# Patient Record
Sex: FEMALE | Race: WHITE
[De-identification: names, ages, dates, MRNs, and addresses within clinical notes are randomized per-mention and may not be internally consistent; named-entity substitution may affect disease eponyms.]

---

## 2020-09-29 ENCOUNTER — HOSPITAL ENCOUNTER (EMERGENCY)
Dept: HOSPITAL 26 - MED | Age: 50
Discharge: HOME | End: 2020-09-29
Payer: MEDICAID

## 2020-09-29 VITALS — SYSTOLIC BLOOD PRESSURE: 106 MMHG | DIASTOLIC BLOOD PRESSURE: 66 MMHG

## 2020-09-29 VITALS — HEIGHT: 58 IN | WEIGHT: 125 LBS | BODY MASS INDEX: 26.24 KG/M2

## 2020-09-29 DIAGNOSIS — N39.0: Primary | ICD-10-CM

## 2020-09-29 DIAGNOSIS — M72.2: ICD-10-CM

## 2020-09-29 PROCEDURE — 99284 EMERGENCY DEPT VISIT MOD MDM: CPT

## 2020-09-29 PROCEDURE — 82948 REAGENT STRIP/BLOOD GLUCOSE: CPT

## 2020-09-29 PROCEDURE — 96372 THER/PROPH/DIAG INJ SC/IM: CPT

## 2020-09-29 PROCEDURE — 81002 URINALYSIS NONAUTO W/O SCOPE: CPT

## 2020-09-29 NOTE — NUR
Patient discharged with v/s stable. Written and verbal after care instructions 
given and explained. 

Patient alert, oriented and verbalized understanding of instructions. 
Ambulatory with steady gait. All questions addressed prior to discharge. ID 
band removed. Patient advised to follow up with PMD. Rx of macrobid, naproxen 
given. Patient educated on indication of medication including possible reaction 
and side effects. Opportunity to ask questions provided and answered.

## 2020-09-29 NOTE — NUR
49 year old female coming in for c/o urinary burning x 2 days. states that she 
has seen PCP and was told she has UTI. denies hematuria. denies any other pain. 
denies other s/sx. denies any injury or trauma. denies pmhx. nkda.

## 2020-11-29 ENCOUNTER — HOSPITAL ENCOUNTER (EMERGENCY)
Dept: HOSPITAL 26 - MED | Age: 50
Discharge: HOME | End: 2020-11-29
Payer: MEDICAID

## 2020-11-29 VITALS — BODY MASS INDEX: 25.2 KG/M2 | WEIGHT: 125 LBS | HEIGHT: 59 IN

## 2020-11-29 VITALS — DIASTOLIC BLOOD PRESSURE: 61 MMHG | SYSTOLIC BLOOD PRESSURE: 100 MMHG

## 2020-11-29 VITALS — SYSTOLIC BLOOD PRESSURE: 100 MMHG | DIASTOLIC BLOOD PRESSURE: 61 MMHG

## 2020-11-29 DIAGNOSIS — M54.6: ICD-10-CM

## 2020-11-29 DIAGNOSIS — R05: ICD-10-CM

## 2020-11-29 DIAGNOSIS — M79.10: Primary | ICD-10-CM

## 2020-11-29 LAB
ANION GAP SERPL CALCULATED.3IONS-SCNC: 10.9 MMOL/L (ref 8–16)
APPEARANCE UR: CLEAR
BILIRUB UR QL STRIP: NEGATIVE
BUN SERPL-MCNC: 9 MG/DL (ref 7–18)
CHLORIDE SERPL-SCNC: 103 MMOL/L (ref 98–107)
CO2 SERPL-SCNC: 29.8 MMOL/L (ref 21–32)
COLOR UR: YELLOW
CREAT SERPL-MCNC: 0.9 MG/DL (ref 0.6–1.3)
GFR SERPL CREATININE-BSD FRML MDRD: 86 ML/MIN (ref 90–?)
GLUCOSE SERPL-MCNC: 107 MG/DL (ref 74–106)
GLUCOSE UR STRIP-MCNC: (no result) MG/DL
HGB UR QL STRIP: (no result)
LEUKOCYTE ESTERASE UR QL STRIP: NEGATIVE
NITRITE UR QL STRIP: NEGATIVE
PH UR STRIP: 6.5 [PH] (ref 5–9)
POTASSIUM SERPL-SCNC: 3.7 MMOL/L (ref 3.5–5.1)
RBC #/AREA URNS HPF: (no result) /HPF (ref 0–5)
SODIUM SERPL-SCNC: 140 MMOL/L (ref 136–145)
WBC,URINE: (no result) /HPF (ref 0–5)

## 2020-11-29 PROCEDURE — 96372 THER/PROPH/DIAG INJ SC/IM: CPT

## 2020-11-29 PROCEDURE — 71045 X-RAY EXAM CHEST 1 VIEW: CPT

## 2020-11-29 PROCEDURE — 87086 URINE CULTURE/COLONY COUNT: CPT

## 2020-11-29 PROCEDURE — 99284 EMERGENCY DEPT VISIT MOD MDM: CPT

## 2020-11-29 PROCEDURE — 80048 BASIC METABOLIC PNL TOTAL CA: CPT

## 2020-11-29 PROCEDURE — 81025 URINE PREGNANCY TEST: CPT

## 2020-11-29 PROCEDURE — 81001 URINALYSIS AUTO W/SCOPE: CPT

## 2020-11-29 PROCEDURE — 36415 COLL VENOUS BLD VENIPUNCTURE: CPT

## 2020-11-29 NOTE — NUR
49 YEAR OLD FEMALE COMPLAINS OF LOWER BACK PAIN SINCE YESTERDAY.  PT DENIES ANY 
TRAUMA, ONLY PROVOKED WHEN MOVEMENT OCCURS. PT DENIES ANY PROBLEMS WITH 
URINATION OR DEFECATION. PT AOX4, BREATHING EVEN AND UNLABORED, SKIN WARM AND 
DRY. BED IN LOWEST POSITION, LOCKED, BED RAIL UPX1.



PMH - DENIES

ALLERGIES - NKA

## 2020-11-29 NOTE — NUR
Patient discharged with v/s stable. Written and verbal after care instructions 
given and explained. 

Patient alert, oriented and verbalized understanding of instructions. 
Ambulatory with steady gait. All questions addressed prior to discharge. ID 
band removed. Patient advised to follow up with PMD. Rx of FLEXERIL AND 
NAPROSYN given. Patient educated on indication of medication including possible 
reaction and side effects. Opportunity to ask questions provided and answered.

## 2021-04-19 ENCOUNTER — HOSPITAL ENCOUNTER (EMERGENCY)
Dept: HOSPITAL 26 - MED | Age: 51
Discharge: HOME | End: 2021-04-19
Payer: MEDICAID

## 2021-04-19 VITALS — HEIGHT: 60 IN | BODY MASS INDEX: 23.56 KG/M2 | WEIGHT: 120 LBS

## 2021-04-19 VITALS — DIASTOLIC BLOOD PRESSURE: 63 MMHG | SYSTOLIC BLOOD PRESSURE: 111 MMHG

## 2021-04-19 VITALS — DIASTOLIC BLOOD PRESSURE: 50 MMHG | SYSTOLIC BLOOD PRESSURE: 108 MMHG

## 2021-04-19 DIAGNOSIS — Z79.899: ICD-10-CM

## 2021-04-19 DIAGNOSIS — Z98.890: ICD-10-CM

## 2021-04-19 DIAGNOSIS — K59.09: ICD-10-CM

## 2021-04-19 DIAGNOSIS — K29.70: Primary | ICD-10-CM

## 2021-04-19 LAB
ALBUMIN FLD-MCNC: 3.7 G/DL (ref 3.4–5)
ANION GAP SERPL CALCULATED.3IONS-SCNC: 10.4 MMOL/L (ref 8–16)
APPEARANCE UR: (no result)
AST SERPL-CCNC: 18 U/L (ref 15–37)
BASOPHILS # BLD AUTO: 0 K/UL (ref 0–0.22)
BASOPHILS NFR BLD AUTO: 0.5 % (ref 0–2)
BILIRUB SERPL-MCNC: 0.4 MG/DL (ref 0–1)
BILIRUB UR QL STRIP: NEGATIVE
BUN SERPL-MCNC: 15 MG/DL (ref 7–18)
CHLORIDE SERPL-SCNC: 106 MMOL/L (ref 98–107)
CO2 SERPL-SCNC: 26.2 MMOL/L (ref 21–32)
COLOR UR: YELLOW
CREAT SERPL-MCNC: 0.5 MG/DL (ref 0.6–1.3)
EOSINOPHIL # BLD AUTO: 0 K/UL (ref 0–0.4)
EOSINOPHIL NFR BLD AUTO: 0.8 % (ref 0–4)
ERYTHROCYTE [DISTWIDTH] IN BLOOD BY AUTOMATED COUNT: 12.7 % (ref 11.6–13.7)
GFR SERPL CREATININE-BSD FRML MDRD: 169 ML/MIN (ref 90–?)
GLUCOSE SERPL-MCNC: 121 MG/DL (ref 74–106)
GLUCOSE UR STRIP-MCNC: NEGATIVE MG/DL
HCT VFR BLD AUTO: 33.3 % (ref 36–48)
HGB BLD-MCNC: 11.1 G/DL (ref 12–16)
HGB UR QL STRIP: (no result)
LEUKOCYTE ESTERASE UR QL STRIP: NEGATIVE
LIPASE SERPL-CCNC: 213 U/L (ref 73–393)
LYMPHOCYTES # BLD AUTO: 1.2 K/UL (ref 2.5–16.5)
LYMPHOCYTES NFR BLD AUTO: 24 % (ref 20.5–51.1)
MCH RBC QN AUTO: 30 PG (ref 27–31)
MCHC RBC AUTO-ENTMCNC: 33 G/DL (ref 33–37)
MCV RBC AUTO: 88.9 FL (ref 80–94)
MONOCYTES # BLD AUTO: 0.4 K/UL (ref 0.8–1)
MONOCYTES NFR BLD AUTO: 8.7 % (ref 1.7–9.3)
NEUTROPHILS # BLD AUTO: 3.3 K/UL (ref 1.8–7.7)
NEUTROPHILS NFR BLD AUTO: 66 % (ref 42.2–75.2)
NITRITE UR QL STRIP: NEGATIVE
PH UR STRIP: 5.5 [PH] (ref 5–9)
PLATELET # BLD AUTO: 256 K/UL (ref 140–450)
POTASSIUM SERPL-SCNC: 3.6 MMOL/L (ref 3.5–5.1)
RBC # BLD AUTO: 3.74 MIL/UL (ref 4.2–5.4)
RBC #/AREA URNS HPF: >100 /HPF (ref 0–5)
SODIUM SERPL-SCNC: 139 MMOL/L (ref 136–145)
WBC # BLD AUTO: 5.1 K/UL (ref 4.8–10.8)
WBC,URINE: (no result) /HPF (ref 0–5)

## 2021-04-19 PROCEDURE — 81001 URINALYSIS AUTO W/SCOPE: CPT

## 2021-04-19 PROCEDURE — 83690 ASSAY OF LIPASE: CPT

## 2021-04-19 PROCEDURE — 80053 COMPREHEN METABOLIC PANEL: CPT

## 2021-04-19 PROCEDURE — 85025 COMPLETE CBC W/AUTO DIFF WBC: CPT

## 2021-04-19 PROCEDURE — 74176 CT ABD & PELVIS W/O CONTRAST: CPT

## 2021-04-19 PROCEDURE — 81025 URINE PREGNANCY TEST: CPT

## 2021-04-19 PROCEDURE — 36415 COLL VENOUS BLD VENIPUNCTURE: CPT

## 2021-04-19 PROCEDURE — 99284 EMERGENCY DEPT VISIT MOD MDM: CPT

## 2021-04-19 PROCEDURE — 96372 THER/PROPH/DIAG INJ SC/IM: CPT

## 2023-07-16 ENCOUNTER — HOSPITAL ENCOUNTER (EMERGENCY)
Dept: HOSPITAL 26 - MED | Age: 53
Discharge: HOME | End: 2023-07-16
Payer: MEDICAID

## 2023-07-16 VITALS — WEIGHT: 112 LBS | HEIGHT: 56 IN | BODY MASS INDEX: 25.19 KG/M2

## 2023-07-16 VITALS
SYSTOLIC BLOOD PRESSURE: 94 MMHG | HEART RATE: 70 BPM | RESPIRATION RATE: 16 BRPM | TEMPERATURE: 97.8 F | DIASTOLIC BLOOD PRESSURE: 67 MMHG

## 2023-07-16 DIAGNOSIS — K29.70: Primary | ICD-10-CM

## 2023-07-16 DIAGNOSIS — K21.9: ICD-10-CM

## 2023-07-16 DIAGNOSIS — Z79.899: ICD-10-CM

## 2023-07-16 DIAGNOSIS — Z98.890: ICD-10-CM

## 2023-07-16 LAB
ALBUMIN FLD-MCNC: 3.8 G/DL (ref 3.4–5)
ANION GAP SERPL CALCULATED.3IONS-SCNC: 12.5 MMOL/L (ref 8–16)
AST SERPL-CCNC: 21 U/L (ref 15–37)
BASOPHILS # BLD AUTO: 0 K/UL (ref 0–0.22)
BASOPHILS NFR BLD AUTO: 0.9 % (ref 0–2)
BILIRUB SERPL-MCNC: 0.5 MG/DL (ref 0–1)
BUN SERPL-MCNC: 14 MG/DL (ref 7–18)
CHLORIDE SERPL-SCNC: 102 MMOL/L (ref 98–107)
CO2 SERPL-SCNC: 26.3 MMOL/L (ref 21–32)
CREAT SERPL-MCNC: 0.5 MG/DL (ref 0.6–1.3)
EOSINOPHIL # BLD AUTO: 0 K/UL (ref 0–0.4)
EOSINOPHIL NFR BLD AUTO: 0.3 % (ref 0–4)
ERYTHROCYTE [DISTWIDTH] IN BLOOD BY AUTOMATED COUNT: 13.8 % (ref 11.6–13.7)
GFR SERPL CREATININE-BSD FRML MDRD: 167 ML/MIN (ref 90–?)
GLUCOSE SERPL-MCNC: 86 MG/DL (ref 74–106)
HCT VFR BLD AUTO: 37.2 % (ref 36–48)
HGB BLD-MCNC: 12.7 G/DL (ref 12–16)
LIPASE SERPL-CCNC: 185 U/L (ref 73–393)
LYMPHOCYTES # BLD AUTO: 1.6 K/UL (ref 2.5–16.5)
LYMPHOCYTES NFR BLD AUTO: 31.8 % (ref 20.5–51.1)
MCH RBC QN AUTO: 30 PG (ref 27–31)
MCHC RBC AUTO-ENTMCNC: 34 G/DL (ref 33–37)
MCV RBC AUTO: 88.4 FL (ref 80–94)
MONOCYTES # BLD AUTO: 0.5 K/UL (ref 0.8–1)
MONOCYTES NFR BLD AUTO: 9.9 % (ref 1.7–9.3)
NEUTROPHILS # BLD AUTO: 3 K/UL (ref 1.8–7.7)
NEUTROPHILS NFR BLD AUTO: 57.1 % (ref 42.2–75.2)
PLATELET # BLD AUTO: 243 K/UL (ref 140–450)
POTASSIUM SERPL-SCNC: 3.8 MMOL/L (ref 3.5–5.1)
RBC # BLD AUTO: 4.21 MIL/UL (ref 4.2–5.4)
SODIUM SERPL-SCNC: 137 MMOL/L (ref 136–145)
WBC # BLD AUTO: 5.2 K/UL (ref 4.8–10.8)

## 2023-07-16 NOTE — NUR
51 YO F C/O NON-RADIATING LUQ PAIN X 3 DAYS. BURNING W/URINATION X 2 DAYS. PT 
DENIES N,V,D,C, FEVER, CHILLS, FLU SYMPTOMS, INJURY, SOB, CP. SKIN 
DRY/INTACT/PINK. PT AOX4, NAD NOTED, SAFETY MAINTAINED.



HX: DENIES

NKA
PT AMBULATED TO BED 4 WITH STEADY GAIT
Patient discharged with v/s stable. Written and verbal after care instructions 
given and explained. 

Patient alert, oriented and verbalized understanding of instructions. 
Ambulatory with steady gait. All questions addressed prior to discharge. ID 
band removed. Patient advised to follow up with PMD. Rx of MAG HYDROX/AK 
HYDROX/SIMETH,FAMOTIDINE given. Opportunity to ask questions provided and 
answered.
Abdominal pain